# Patient Record
Sex: FEMALE | Race: WHITE | Employment: STUDENT | URBAN - METROPOLITAN AREA
[De-identification: names, ages, dates, MRNs, and addresses within clinical notes are randomized per-mention and may not be internally consistent; named-entity substitution may affect disease eponyms.]

---

## 2023-03-14 ENCOUNTER — APPOINTMENT (OUTPATIENT)
Dept: CT IMAGING | Age: 38
End: 2023-03-14
Attending: EMERGENCY MEDICINE
Payer: COMMERCIAL

## 2023-03-14 ENCOUNTER — APPOINTMENT (OUTPATIENT)
Dept: GENERAL RADIOLOGY | Age: 38
End: 2023-03-14
Attending: EMERGENCY MEDICINE
Payer: COMMERCIAL

## 2023-03-14 ENCOUNTER — HOSPITAL ENCOUNTER (EMERGENCY)
Age: 38
Discharge: HOME | End: 2023-03-14
Attending: EMERGENCY MEDICINE
Payer: COMMERCIAL

## 2023-03-14 VITALS
TEMPERATURE: 98.3 F | DIASTOLIC BLOOD PRESSURE: 71 MMHG | SYSTOLIC BLOOD PRESSURE: 110 MMHG | RESPIRATION RATE: 15 BRPM | HEIGHT: 67 IN | BODY MASS INDEX: 26.09 KG/M2 | OXYGEN SATURATION: 98 % | WEIGHT: 166.23 LBS | HEART RATE: 78 BPM

## 2023-03-14 DIAGNOSIS — R07.9 CHEST PAIN, UNSPECIFIED TYPE: Primary | ICD-10-CM

## 2023-03-14 DIAGNOSIS — M79.602 LEFT ARM PAIN: ICD-10-CM

## 2023-03-14 LAB
ALBUMIN SERPL-MCNC: 4.1 G/DL (ref 3.5–5)
ALBUMIN/GLOB SERPL: 1.2 (ref 1.1–2.2)
ALP SERPL-CCNC: 67 U/L (ref 45–117)
ALT SERPL-CCNC: 18 U/L (ref 12–78)
ANION GAP SERPL CALC-SCNC: 12 MMOL/L (ref 5–15)
AST SERPL-CCNC: 13 U/L (ref 15–37)
ATRIAL RATE: 86 BPM
BASOPHILS # BLD: 0 K/UL (ref 0–0.1)
BASOPHILS NFR BLD: 1 % (ref 0–1)
BILIRUB SERPL-MCNC: 0.3 MG/DL (ref 0.2–1)
BUN SERPL-MCNC: 11 MG/DL (ref 6–20)
BUN/CREAT SERPL: 13 (ref 12–20)
CALCIUM SERPL-MCNC: 8.8 MG/DL (ref 8.5–10.1)
CALCULATED P AXIS, ECG09: 52 DEGREES
CALCULATED R AXIS, ECG10: 34 DEGREES
CALCULATED T AXIS, ECG11: 28 DEGREES
CHLORIDE SERPL-SCNC: 104 MMOL/L (ref 97–108)
CO2 SERPL-SCNC: 25 MMOL/L (ref 21–32)
CREAT SERPL-MCNC: 0.83 MG/DL (ref 0.55–1.02)
DIAGNOSIS, 93000: NORMAL
DIFFERENTIAL METHOD BLD: NORMAL
EOSINOPHIL # BLD: 0.2 K/UL (ref 0–0.4)
EOSINOPHIL NFR BLD: 3 % (ref 0–7)
ERYTHROCYTE [DISTWIDTH] IN BLOOD BY AUTOMATED COUNT: 12.2 % (ref 11.5–14.5)
GLOBULIN SER CALC-MCNC: 3.3 G/DL (ref 2–4)
GLUCOSE SERPL-MCNC: 105 MG/DL (ref 65–100)
HCT VFR BLD AUTO: 38.6 % (ref 35–47)
HGB BLD-MCNC: 12.7 G/DL (ref 11.5–16)
IMM GRANULOCYTES # BLD AUTO: 0 K/UL (ref 0–0.04)
IMM GRANULOCYTES NFR BLD AUTO: 0 % (ref 0–0.5)
LYMPHOCYTES # BLD: 2.2 K/UL (ref 0.8–3.5)
LYMPHOCYTES NFR BLD: 34 % (ref 12–49)
MCH RBC QN AUTO: 29.1 PG (ref 26–34)
MCHC RBC AUTO-ENTMCNC: 32.9 G/DL (ref 30–36.5)
MCV RBC AUTO: 88.5 FL (ref 80–99)
MONOCYTES # BLD: 0.5 K/UL (ref 0–1)
MONOCYTES NFR BLD: 7 % (ref 5–13)
NEUTS SEG # BLD: 3.6 K/UL (ref 1.8–8)
NEUTS SEG NFR BLD: 55 % (ref 32–75)
NRBC # BLD: 0 K/UL (ref 0–0.01)
NRBC BLD-RTO: 0 PER 100 WBC
P-R INTERVAL, ECG05: 152 MS
PLATELET # BLD AUTO: 294 K/UL (ref 150–400)
PMV BLD AUTO: 10.1 FL (ref 8.9–12.9)
POTASSIUM SERPL-SCNC: 3.5 MMOL/L (ref 3.5–5.1)
PROT SERPL-MCNC: 7.4 G/DL (ref 6.4–8.2)
Q-T INTERVAL, ECG07: 362 MS
QRS DURATION, ECG06: 72 MS
QTC CALCULATION (BEZET), ECG08: 433 MS
RBC # BLD AUTO: 4.36 M/UL (ref 3.8–5.2)
SODIUM SERPL-SCNC: 141 MMOL/L (ref 136–145)
TROPONIN I SERPL HS-MCNC: <4 NG/L (ref 0–51)
VENTRICULAR RATE, ECG03: 86 BPM
WBC # BLD AUTO: 6.5 K/UL (ref 3.6–11)

## 2023-03-14 PROCEDURE — 99285 EMERGENCY DEPT VISIT HI MDM: CPT

## 2023-03-14 PROCEDURE — 93005 ELECTROCARDIOGRAM TRACING: CPT

## 2023-03-14 PROCEDURE — 36415 COLL VENOUS BLD VENIPUNCTURE: CPT

## 2023-03-14 PROCEDURE — 80053 COMPREHEN METABOLIC PANEL: CPT

## 2023-03-14 PROCEDURE — 70450 CT HEAD/BRAIN W/O DYE: CPT

## 2023-03-14 PROCEDURE — 84484 ASSAY OF TROPONIN QUANT: CPT

## 2023-03-14 PROCEDURE — 71046 X-RAY EXAM CHEST 2 VIEWS: CPT

## 2023-03-14 PROCEDURE — 85025 COMPLETE CBC W/AUTO DIFF WBC: CPT

## 2023-03-14 RX ORDER — MULTIVITAMIN
1 TABLET ORAL DAILY
COMMUNITY

## 2023-03-14 NOTE — ED PROVIDER NOTES
HPI   60-year-old female with no significant past medical history presents to the emergency department due to left upper extremity pain and chest pain. Patient symptoms started at 630 with left shoulder pain. She thought she slept on it wrong but it progressively got worse. It radiated up to her jaw and then now has become isolated to her chest.  She says she has had some lightheadedness and nausea. She says she feels short of breath as well. She tells me her left upper extremity feels cold but also that her sensations feel \"flipped\". No headache. No visual changes. She is never had symptoms like this in the past.  No cough. No fever. No color changes to her extremities. History reviewed. No pertinent past medical history. Past Surgical History:   Procedure Laterality Date    HX OTHER SURGICAL      \"lump on shoulder removed\"    HX TONSILLECTOMY      HX WISDOM TEETH EXTRACTION           History reviewed. No pertinent family history. Social History     Socioeconomic History    Marital status: Not on file     Spouse name: Not on file    Number of children: Not on file    Years of education: Not on file    Highest education level: Not on file   Occupational History    Not on file   Tobacco Use    Smoking status: Never    Smokeless tobacco: Never   Substance and Sexual Activity    Alcohol use: Never    Drug use: Never    Sexual activity: Not on file   Other Topics Concern    Not on file   Social History Narrative    Not on file     Social Determinants of Health     Financial Resource Strain: Not on file   Food Insecurity: Not on file   Transportation Needs: Not on file   Physical Activity: Not on file   Stress: Not on file   Social Connections: Not on file   Intimate Partner Violence: Not on file   Housing Stability: Not on file         ALLERGIES: Patient has no known allergies.     Review of Systems  A complete review of systems was performed and all systems reviewed are negative less otherwise documented in HPI  Vitals:    03/14/23 1246 03/14/23 1416 03/14/23 1446 03/14/23 1501   BP: 119/86 122/81 119/75 110/71   Pulse: 85 84 81 75   Resp: 15 9 18 8   Temp: 98.3 °F (36.8 °C)      SpO2: 96% 98%  99%   Weight: 75.4 kg (166 lb 3.6 oz)      Height: 5' 6.5\" (1.689 m)               Physical Exam  Constitutional:       Comments: Not acutely distressed or ill-appearing. Slightly tearful   HENT:      Mouth/Throat:      Comments: Moist mucous membranes  Eyes:      General: No scleral icterus. Extraocular Movements: Extraocular movements intact. Comments: Pupils are 3 mm and reactive to light bilaterally. Extraocular movements intact   Neck:      Comments: Trachea midline. No JVD. No carotid bruits  Cardiovascular:      Rate and Rhythm: Normal rate and regular rhythm. Heart sounds: No murmur heard. Comments: Plus radial and DP pulses bilaterally. Normal capillary refill in all 4 distal extremities. Pulmonary:      Effort: Pulmonary effort is normal. No respiratory distress. Breath sounds: Normal breath sounds. No wheezing or rales. Abdominal:      General: There is no distension. Palpations: Abdomen is soft. Tenderness: There is no abdominal tenderness. Musculoskeletal:         General: No deformity. Normal range of motion. Cervical back: Normal range of motion. Right lower leg: No edema. Left lower leg: No edema. Skin:     General: Skin is warm and dry. Neurological:      Comments: Awake and alert. GCS 15. No facial droop. No drift in the upper or lower extremities. No dysmetria with finger-to-nose testing. No nystagmus appreciated. NIH stroke scale 0. Medical Decision Making  Amount and/or Complexity of Data Reviewed  Labs: ordered. Radiology: ordered. ECG/medicine tests: ordered. ED Course as of 03/14/23 1533   Tue Mar 14, 2023   1330 EKG shows normal sinus rhythm with rate of 86. QTc 433.   No concerning ST elevations or depressions. No arrhythmias noted. No STEMI. [MM]      ED Course User Index  [MM] Mayank Hoff MD   36-year-old female presents with the above chief complaint. She is stable vital signs. She is nontoxic on exam.  She has great pulses and brisk capillary refill in all 4 extremities. No neurologic deficits. EKG is reassuring. ACS work-up will be initiated. Given her normal vascular exam and normal neurologic exam I have a low suspicion for dissection. She is declining any medication for pain and nausea at this time. Initial work-up reassuring with high-sensitivity troponin less than 4. Chest x-ray normal.  On reexamination patient slightly tearful stating that mentally she does not feel right. She says she feels slow. She also tells me she has been under a lot of stress as she is going to school for IT. Discussed further work-up including a head CT and she is agreeable to this. The study showed no acute abnormalities such as stroke or bleeding. Given her reassuring work-up I do feel the patient can safely follow-up with her PCP. She is given information to follow-up with cardiology as well. Patient discharged in stable condition.     Procedures

## 2023-03-14 NOTE — ED NOTES
Pulse Ox reapplied to patient's finger. Patient was resting quietly prior to applying pulse ox. Patient was responsive to voice, interactive, and followed commands. Patient and family updated that imaging has resulted and provider will be in momentarily.

## 2023-03-14 NOTE — DISCHARGE INSTRUCTIONS
Return to the ER with any new or worsening symptoms.   In the meantime please follow-up with your primary care doctor and you can also follow-up with cardiology as well

## 2023-03-14 NOTE — ED NOTES
Patient rounded on. Patient provided with blankets for comfort and updated on progression of care. Progression of care includes approximate time of results and up coming x ray.

## 2023-03-14 NOTE — ED NOTES
Patient and family member called out using call bell in regards to monitor alarms. This RN came to bedside. Educated patient and family member on monitor values. Patient expresses her feet are cold, L arm is tingling, denies tingling/numbness elsewhere, and feels like she cannot complete her thoughts. Patient is alert and VSS at this time. Provider made aware.

## 2023-03-14 NOTE — ED TRIAGE NOTES
Triage: pt woke today around 0630 w/o left shoulder discomfort and then has progressively gotten worse radiating into left jaw and arm. +dizziness, nausea and SOB as day has progressed. Denies fever.